# Patient Record
Sex: FEMALE | Race: WHITE | NOT HISPANIC OR LATINO | Employment: FULL TIME | URBAN - METROPOLITAN AREA
[De-identification: names, ages, dates, MRNs, and addresses within clinical notes are randomized per-mention and may not be internally consistent; named-entity substitution may affect disease eponyms.]

---

## 2017-04-15 LAB
25(OH)D3+25(OH)D2 SERPL-MCNC: 21.3 NG/ML (ref 30–100)
ALBUMIN/CREAT UR: 14.4 MG/G CREAT (ref 0–30)
BUN SERPL-MCNC: 25 MG/DL (ref 6–24)
BUN/CREAT SERPL: 27 (ref 9–23)
CALCIUM SERPL-MCNC: 10.6 MG/DL (ref 8.7–10.2)
CHLORIDE SERPL-SCNC: 105 MMOL/L (ref 96–106)
CO2 SERPL-SCNC: 23 MMOL/L (ref 18–29)
CREAT SERPL-MCNC: 0.91 MG/DL (ref 0.57–1)
CREAT UR-MCNC: 65.2 MG/DL
GLUCOSE SERPL-MCNC: 90 MG/DL (ref 65–99)
HBA1C MFR BLD: 5.7 % (ref 4.8–5.6)
MICROALBUMIN UR-MCNC: 9.4 UG/ML
POTASSIUM SERPL-SCNC: 4.4 MMOL/L (ref 3.5–5.2)
SODIUM SERPL-SCNC: 143 MMOL/L (ref 134–144)

## 2017-04-17 ENCOUNTER — TELEPHONE (OUTPATIENT)
Dept: MEDICAL GROUP | Facility: MEDICAL CENTER | Age: 57
End: 2017-04-17

## 2017-04-18 ENCOUNTER — OFFICE VISIT (OUTPATIENT)
Dept: MEDICAL GROUP | Facility: MEDICAL CENTER | Age: 57
End: 2017-04-18
Payer: COMMERCIAL

## 2017-04-18 VITALS
BODY MASS INDEX: 34.93 KG/M2 | WEIGHT: 185 LBS | DIASTOLIC BLOOD PRESSURE: 80 MMHG | HEART RATE: 85 BPM | OXYGEN SATURATION: 97 % | SYSTOLIC BLOOD PRESSURE: 120 MMHG | TEMPERATURE: 97.2 F | HEIGHT: 61 IN

## 2017-04-18 DIAGNOSIS — E79.0 HYPERURICEMIA: ICD-10-CM

## 2017-04-18 DIAGNOSIS — E55.9 VITAMIN D DEFICIENCY: ICD-10-CM

## 2017-04-18 DIAGNOSIS — K59.9 ABNORMAL LARGE BOWEL MOTILITY: ICD-10-CM

## 2017-04-18 DIAGNOSIS — E11.51 DM (DIABETES MELLITUS) TYPE II CONTROLLED PERIPHERAL VASCULAR DISORDER: ICD-10-CM

## 2017-04-18 DIAGNOSIS — E66.9 OBESITY (BMI 30-39.9): ICD-10-CM

## 2017-04-18 DIAGNOSIS — F32.89 OTHER DEPRESSION: ICD-10-CM

## 2017-04-18 DIAGNOSIS — E83.52 HYPERCALCEMIA: ICD-10-CM

## 2017-04-18 PROCEDURE — 99214 OFFICE O/P EST MOD 30 MIN: CPT | Performed by: NURSE PRACTITIONER

## 2017-04-18 ASSESSMENT — PATIENT HEALTH QUESTIONNAIRE - PHQ9: CLINICAL INTERPRETATION OF PHQ2 SCORE: 3

## 2017-04-19 NOTE — PROGRESS NOTES
Subjective:      Yancy Ga is a 57 y.o. female who presents with No chief complaint on file.            HPI  Seen in fu/ for DM.  She is moving to [facility] this week.  Will make sure she has 90 days supply of meds.  Feeling down.  She is depressed about the move.  She has been crying a lot about the move.    Reviewed lab with pt.  Her BMP shows elvated ca++ at 10.6.  Was 10.6 last time 6/16.  Has been as high as 11 in 2014.  PTH in past was wnl.  Globulin is wnl.    Vitamin d is low at 21.3.  She is on 2000 units d3 daily   She is due her mammo, retinal eye exam.  Cannot do here before leaving.   Alb/cr ratio is wnl.  a1c is well controlled on meds at 5.7  Better than last time.   She has a long hx of not having a bm for 3 weeks.   No abd pain.  Has upper abd bloating chronic.   She had a colonosocpy for bloating and no bm except every 3 weeks.  They found gluten intolerance and diverticulosis.  She thinks they also found polyps.  She thinks that she is due repeat in 3 years.  That would be 10/16.   Also only voids about 2x/day.  No discomfort.  Drinks adequate water.  Drinks lots of tea.    Has a murmur.  Recent echo showed mild MR.     Patient Active Problem List    Diagnosis Date Noted   • Obesity (BMI 30-39.9) 04/18/2017   • H/O cardiac catheterization    • Undiagnosed cardiac murmurs    • Preventative health care 10/08/2014   • Diabetes mellitus, type II (CMS-HCC)    • Essential hypertension, benign    • Seizures (CMS-HCC)    • Vitamin D deficiency    • Chronic gout    • Coronary artery disease due to lipid rich plaque    • Chronic constipation    • Chronic kidney disease (CKD), stage III (moderate)      Current Outpatient Prescriptions   Medication Sig Dispense Refill   • colchicine (COLCRYS) 0.6 MG Tab Take 1 Tab by mouth 2 times a day. 180 Tab 1   • B-D ULTRAFINE III SHORT PEN 31G X 8 MM Misc AS DIRECTED 100 Each 3   • allopurinol (ZYLOPRIM) 300 MG Tab Take 1 Tab by mouth every day. 90 Tab 1   • amlodipine  "(NORVASC) 10 MG Tab Take 1 Tab by mouth every day. 90 Tab 1   • liraglutide (VICTOZA) 18 MG/3ML Solution Pen-injector injection Inject 0.3 mL as instructed every day. 9 PEN 1   • omeprazole (PRILOSEC) 20 MG delayed-release capsule Take 1 Cap by mouth every day. 90 Cap 1   • lisinopril (PRINIVIL) 20 MG Tab Take 1 Tab by mouth 2 times a day. 180 Tab 1   • spironolactone (ALDACTONE) 25 MG Tab Take 1 Tab by mouth every day. 90 Tab 1   • zonisamide (ZONEGRAN) 25 MG capsule Take 2 Caps by mouth 2 times a day. 360 Cap 1   • gabapentin (NEURONTIN) 300 MG Cap   0     No current facility-administered medications for this visit.       ROS    Review of Systems   Constitutional: Negative.  Negative for fever, chills, weight loss, malaise/fatigue and diaphoresis.   HENT: Negative.  Negative for hearing loss, ear pain, nosebleeds, congestion, sore throat, neck pain, tinnitus and ear discharge.    Respiratory: Negative.  Negative for cough, hemoptysis, sputum production, shortness of breath, wheezing and stridor.    Cardiovascular: Negative.  Negative for chest pain, palpitations, orthopnea, claudication, leg swelling and PND.   Gastrointestinal: denies nausea, vomiting, diarrhea, heartburn, melena or hematochezia.  Genitourinary: Denies dysuria, hematuria, urinary incontinence, frequency or urgency.             Objective:     /80 mmHg  Pulse 85  Temp(Src) 36.2 °C (97.2 °F)  Ht 1.549 m (5' 1\")  Wt 83.915 kg (185 lb)  BMI 34.97 kg/m2  SpO2 97%  Breastfeeding? No     Physical Exam      Physical Exam   Vitals reviewed.  Constitutional: oriented to person, place, and time. appears well-developed and well-nourished. No distress.   HENT:  Head: Normocephalic and atraumatic. Right Ear: External ear normal. Left Ear: External ear normal. Eyes: Right eye exhibits no discharge. Left eye exhibits no discharge. No scleral icterus.  Neck: No JVD present.  Cardiovascular: Normal rate, regular rhythm, normal heart sounds and intact " distal pulses.  Exam reveals no gallop and no friction rub.  2/6 systolic murmur heard.  No carotid bruits.   Pulmonary/Chest: Effort normal and breath sounds normal. No stridor. No respiratory distress. no wheezes or rales. exhibits no tenderness.   Musculoskeletal: Normal range of motion. exhibits no edema. indigo pedal pulses 2+.  Lymphadenopathy: no cervical or supraclavicular adenopathy.   Abd:  No CVAT,  Soft,  Bs noted in all quadrants.  No HSM.  No abdominal tenderness.  Neurological: alert and oriented to person, place, and time. exhibits normal muscle tone. Coordination normal.   Skin: Skin is warm and dry. no diaphoresis.   Psychiatric: normal mood and affect. behavior is normal.            Assessment/Plan:     1. Hypercalcemia  VITAMIN 1,25 DIHYDROXY    PTH RELATED PEPTIDE    PTH was wnl.  will check PTHrp and 1,25 vitamin d.  she will f/u with new PCP in [place] in june.  we wll f/u with all test results.   2. DM (diabetes mellitus) type II controlled peripheral vascular disorder (CMS-HCC)      stabel on meds. s he is due rest of lab in 6/17 with CMP, LP.   3. Vitamin D deficiency      low d.  on 2000 units d3.  will increast to 4000 units recheck with next lab with new PCP if poss   4. Obesity (BMI 30-39.9)  Patient identified as having weight management issue.  Appropriate orders and counseling given.    she is asking for contrave.  will need to get from new PCP.     5. Hyperuricemia      last uric acid high at 10.4. she is due for repeat.    6. Abnormal large bowel motility      ? etiology but due for repeat colonoscopy poss.  will get last colonoscopy  only has bm every 3 weeks.     7. Other depression      depression over having to move. ; will f/u with new PCP.  if goes on contrave could improve sx. will discuss with new PCP     8. Will be due CMP, LP, uric acid, poss due colonoscopy.  Will be due mammo and retinal eye exam.  Will see new PCP after move for these orders.   9.  F/u with pt with test  results for elevated ca++

## 2017-04-23 RX ORDER — GABAPENTIN 300 MG/1
CAPSULE ORAL
Qty: 120 CAP | Refills: 0 | Status: SHIPPED | OUTPATIENT
Start: 2017-04-23 | End: 2020-12-24

## 2017-04-26 ENCOUNTER — TELEPHONE (OUTPATIENT)
Dept: MEDICAL GROUP | Facility: MEDICAL CENTER | Age: 57
End: 2017-04-26

## 2017-04-26 LAB
1,25(OH)2D3 SERPL-MCNC: 43.3 PG/ML (ref 19.9–79.3)
PTH RELATED PROT SERPL-SCNC: <1.1 PMOL/L

## 2017-04-26 NOTE — TELEPHONE ENCOUNTER
Please let pt know that the PTH special test and 1,25 d is wnl.  She will need additional w/u and prob refer to neph for eval when she gets moved to [place]

## 2020-12-10 ENCOUNTER — TELEPHONE (OUTPATIENT)
Dept: SCHEDULING | Facility: IMAGING CENTER | Age: 60
End: 2020-12-10

## 2020-12-24 ENCOUNTER — OFFICE VISIT (OUTPATIENT)
Dept: MEDICAL GROUP | Facility: PHYSICIAN GROUP | Age: 60
End: 2020-12-24
Payer: COMMERCIAL

## 2020-12-24 VITALS
BODY MASS INDEX: 37.11 KG/M2 | HEIGHT: 60 IN | HEART RATE: 90 BPM | WEIGHT: 189 LBS | SYSTOLIC BLOOD PRESSURE: 130 MMHG | DIASTOLIC BLOOD PRESSURE: 80 MMHG | OXYGEN SATURATION: 96 % | TEMPERATURE: 98.6 F | RESPIRATION RATE: 18 BRPM

## 2020-12-24 DIAGNOSIS — M10.00 ACUTE IDIOPATHIC GOUT, UNSPECIFIED SITE: ICD-10-CM

## 2020-12-24 DIAGNOSIS — Z12.31 ENCOUNTER FOR SCREENING MAMMOGRAM FOR MALIGNANT NEOPLASM OF BREAST: ICD-10-CM

## 2020-12-24 DIAGNOSIS — Z00.00 ANNUAL PHYSICAL EXAM: ICD-10-CM

## 2020-12-24 DIAGNOSIS — E11.59 TYPE 2 DIABETES MELLITUS WITH OTHER CIRCULATORY COMPLICATION, WITHOUT LONG-TERM CURRENT USE OF INSULIN (HCC): ICD-10-CM

## 2020-12-24 DIAGNOSIS — I10 ESSENTIAL HYPERTENSION: ICD-10-CM

## 2020-12-24 DIAGNOSIS — E78.2 MIXED HYPERLIPIDEMIA: ICD-10-CM

## 2020-12-24 DIAGNOSIS — M10.9 ACUTE GOUT OF RIGHT FOOT, UNSPECIFIED CAUSE: ICD-10-CM

## 2020-12-24 DIAGNOSIS — I10 ESSENTIAL HYPERTENSION, BENIGN: ICD-10-CM

## 2020-12-24 DIAGNOSIS — E55.9 VITAMIN D DEFICIENCY: ICD-10-CM

## 2020-12-24 DIAGNOSIS — H91.93 DECREASED HEARING OF BOTH EARS: ICD-10-CM

## 2020-12-24 PROCEDURE — 99214 OFFICE O/P EST MOD 30 MIN: CPT | Performed by: NURSE PRACTITIONER

## 2020-12-24 RX ORDER — SPIRONOLACTONE 25 MG/1
25 TABLET ORAL
Qty: 90 TAB | Refills: 0 | Status: SHIPPED | OUTPATIENT
Start: 2020-12-24 | End: 2021-07-06

## 2020-12-24 RX ORDER — ATORVASTATIN CALCIUM 20 MG/1
20 TABLET, FILM COATED ORAL DAILY
Qty: 90 TAB | Refills: 0 | Status: SHIPPED | OUTPATIENT
Start: 2020-12-24 | End: 2021-04-08 | Stop reason: SDUPTHER

## 2020-12-24 RX ORDER — ALLOPURINOL 300 MG/1
300 TABLET ORAL
Qty: 90 TAB | Refills: 0 | Status: SHIPPED | OUTPATIENT
Start: 2020-12-24 | End: 2021-04-08 | Stop reason: SDUPTHER

## 2020-12-24 RX ORDER — AMLODIPINE BESYLATE 10 MG/1
10 TABLET ORAL
Qty: 90 TAB | Refills: 0 | Status: SHIPPED | OUTPATIENT
Start: 2020-12-24 | End: 2021-04-08 | Stop reason: SDUPTHER

## 2020-12-24 RX ORDER — METFORMIN HYDROCHLORIDE 500 MG/1
500 TABLET, EXTENDED RELEASE ORAL DAILY
COMMUNITY
End: 2020-12-24 | Stop reason: SDUPTHER

## 2020-12-24 RX ORDER — ATORVASTATIN CALCIUM 20 MG/1
20 TABLET, FILM COATED ORAL NIGHTLY
COMMUNITY
End: 2020-12-24 | Stop reason: SDUPTHER

## 2020-12-24 RX ORDER — LISINOPRIL 20 MG/1
40 TABLET ORAL DAILY
Qty: 90 TAB | Refills: 0 | Status: SHIPPED | OUTPATIENT
Start: 2020-12-24 | End: 2021-04-08

## 2020-12-24 RX ORDER — COLCHICINE 0.6 MG/1
0.6 TABLET ORAL DAILY
Qty: 90 TAB | Refills: 0 | Status: SHIPPED | OUTPATIENT
Start: 2020-12-24 | End: 2021-04-08 | Stop reason: SDUPTHER

## 2020-12-24 RX ORDER — METFORMIN HYDROCHLORIDE 500 MG/1
500 TABLET, EXTENDED RELEASE ORAL DAILY
Qty: 90 TAB | Refills: 0 | Status: SHIPPED | OUTPATIENT
Start: 2020-12-24 | End: 2021-04-08 | Stop reason: SDUPTHER

## 2020-12-24 RX ORDER — PRAMIPEXOLE DIHYDROCHLORIDE 0.12 MG/1
0.12 TABLET ORAL 3 TIMES DAILY
Qty: 90 TAB | Refills: 3 | Status: SHIPPED | OUTPATIENT
Start: 2020-12-24 | End: 2021-04-08

## 2020-12-24 ASSESSMENT — PATIENT HEALTH QUESTIONNAIRE - PHQ9
5. POOR APPETITE OR OVEREATING: 3 - NEARLY EVERY DAY
SUM OF ALL RESPONSES TO PHQ QUESTIONS 1-9: 13
CLINICAL INTERPRETATION OF PHQ2 SCORE: 3

## 2020-12-24 NOTE — ASSESSMENT & PLAN NOTE
Chronic problem. Taking  Allopurinol 100 mg daily for maintenance and colchicine 0.6 mg. Last flare up over a year ago. Needs refills today.

## 2020-12-24 NOTE — PROGRESS NOTES
Chief Complaint   Patient presents with   • Establish Care       HISTORY OF PRESENT ILLNESS: Patient is a 60 y.o. female, established patient who presents today to discuss medical problems as listed below:    Health Maintenance:  COMPLETED     Essential hypertension, benign  Chronic problem. On lisinopril 40 mg daily, amlodipine 10 mg daily and spironolactone 25 mg. Denies CP, SOB, dizziness, dyspnea, SOB, dizziness, peripheral edema.     Chronic gout  Chronic problem. Taking  Allopurinol 100 mg daily for maintenance and colchicine 0.6 mg. Last flare up over a year ago. Needs refills today.     Mixed hyperlipidemia  Chronic problem, taking atorvastatin 20 mg, tolerating well . Needs refills today. Denies CP, dizziness, SOB, peripheral edema.     Diabetes mellitus, type II  Chronic problem. On MEtformin  mg daily. Has not seen a provider in 3 yrs. Denies CP, polyuria, polydipsia.     Decreased hearing of both ears  Chronic problem. Hard time hearing in both ears. Initially noticed in 2012, problem is slowly getting worse. She denies dizziness, popping or pain in ears.       Patient Active Problem List    Diagnosis Date Noted   • Mixed hyperlipidemia 12/24/2020   • Decreased hearing of both ears 12/24/2020   • Obesity (BMI 30-39.9) 04/18/2017   • H/O cardiac catheterization    • Undiagnosed cardiac murmurs    • Preventative health care 10/08/2014   • Diabetes mellitus, type II (HCC)    • Essential hypertension, benign    • Seizures (HCC)    • Vitamin D deficiency    • Chronic gout    • Coronary artery disease due to lipid rich plaque    • Chronic constipation    • Chronic kidney disease (CKD), stage III (moderate)         Allergies: Byetta    Current Outpatient Medications   Medication Sig Dispense Refill   • atorvastatin (LIPITOR) 20 MG Tab Take 20 mg by mouth every evening.     • metFORMIN ER (GLUCOPHAGE XR) 500 MG TABLET SR 24 HR Take 500 mg by mouth every day.     • colchicine (COLCRYS) 0.6 MG Tab Take 1 Tab  by mouth 2 times a day. 180 Tab 1   • B-D ULTRAFINE III SHORT PEN 31G X 8 MM Misc AS DIRECTED 100 Each 3   • allopurinol (ZYLOPRIM) 300 MG Tab Take 1 Tab by mouth every day. 90 Tab 1   • amlodipine (NORVASC) 10 MG Tab Take 1 Tab by mouth every day. 90 Tab 1   • lisinopril (PRINIVIL) 20 MG Tab Take 1 Tab by mouth 2 times a day. 180 Tab 1   • spironolactone (ALDACTONE) 25 MG Tab Take 1 Tab by mouth every day. 90 Tab 1   • zonisamide (ZONEGRAN) 25 MG capsule Take 2 Caps by mouth 2 times a day. 360 Cap 1     No current facility-administered medications for this visit.        Social History     Tobacco Use   • Smoking status: Never Smoker   • Smokeless tobacco: Never Used   Substance Use Topics   • Alcohol use: No     Alcohol/week: 0.0 oz     Comment: rare   • Drug use: No     Social History     Social History Narrative   • Not on file       Family History   Problem Relation Age of Onset   • Heart Disease Father 72   • Diabetes Father    • Hypertension Father    • Hyperlipidemia Father    • Hypertension Brother    • Cancer Maternal Grandfather         prostate       Allergies, past medical history, past surgical history, family history, social history reviewed and updated.    Review of Systems:     - Constitutional: Negative for fever, chills, unexpected weight change, and fatigue/generalized weakness.     - HEENT: Negative for headaches, vision changes, hearing changes, ear pain, ear discharge, rhinorrhea, sinus congestion, sore throat, and neck pain.      - Respiratory: Negative for cough, sputum production, chest congestion, dyspnea, wheezing, and crackles.      - Cardiovascular: Negative for chest pain, palpitations, orthopnea, and bilateral lower extremity edema.     - Gastrointestinal: Negative for heartburn, nausea, vomiting, abdominal pain, hematochezia, melena, diarrhea, constipation, and greasy/foul-smelling stools.     - Genitourinary: Negative for dysuria, polyuria, hematuria, pyuria, urinary urgency, and  urinary incontinence.    - Musculoskeletal: Negative for myalgias, back pain, and joint pain.     - Skin: Negative for rash, itching, cyanotic skin color change.     - Neurological: Negative for dizziness, tingling, tremors, focal sensory deficit, focal weakness and headaches.     - Endo/Heme/Allergies: Does not bruise/bleed easily.     - Psychiatric/Behavioral: Negative for depression, suicidal/homicidal ideation and memory loss.      All other systems reviewed and are negative    Exam:    /80 (BP Location: Left arm, Patient Position: Sitting, BP Cuff Size: Adult)   Pulse 90   Temp 37 °C (98.6 °F) (Temporal)   Resp 18   Ht 1.524 m (5')   Wt 85.7 kg (189 lb)   SpO2 96%   BMI 36.91 kg/m²  Body mass index is 36.91 kg/m².    Physical Exam:  Constitutional: Well-developed and well-nourished. Not diaphoretic. No distress.   Skin: Skin is warm and dry. No rash noted.  Head: Atraumatic without lesions.  Eyes: Conjunctivae and extraocular motions are normal. Pupils are equal, round, and reactive to light. No scleral icterus.   Ears:  External ears unremarkable. Tympanic membranes clear and intact.  Nose: Nares patent. Septum midline. Turbinates without erythema nor edema. No discharge.   Mouth/Throat: Dentition is normal. Tongue normal. Oropharynx is clear and moist. Posterior pharynx without erythema or exudates.  Neck: Supple, trachea midline. Normal range of motion. No thyromegaly present. No lymphadenopathy--cervical or supraclavicular.  Cardiovascular: Regular rate and rhythm, S1 and S2 without murmur, rubs, or gallops.    Chest: Effort normal. Clear to auscultation throughout. No adventitious sounds. No CVA tenderness.  Abdomen: Soft, non tender, and without distention. Active bowel sounds in all four quadrants. No rebound, guarding, masses or HSM.  : Negative for dysuria, polyuria, hematuria, pyuria, urinary urgency, and urinary incontinence.  Extremities: No cyanosis, clubbing, erythema, nor edema.  Distal pulses intact and symmetric.   Musculoskeletal: All major joints AROM full in all directions without pain.  Neurological: Alert and oriented x 3. DTRs 2+/3 and symmetric. No cranial nerve deficit. 5/5 myotomes. Sensation intact. Negative Rhomberg.  Psychiatric:  Behavior, mood, and affect are appropriate.  MA/nursing note and vitals reviewed.    LABS: 2017  results reviewed and discussed with the patient, questions answered.    Assessment/Plan:  1. Essential hypertension, benign  We will review labs and discuss findings with patient.  Refills given.    2. Mixed hyperlipidemia  Review labs and discuss findings with patient.  Refills given.  - Lipid Profile; Future    3. Encounter for screening mammogram for malignant neoplasm of breast  - MA-SCREENING MAMMO BILAT W/TOMOSYNTHESIS W/CAD; Future    4. Type 2 diabetes mellitus with other circulatory complication, without long-term current use of insulin (HCC)  Review labs, discussed findings with patient.  Refills given.  - REFERRAL TO OPHTHALMOLOGY  - CBC WITH DIFFERENTIAL; Future  - Comp Metabolic Panel; Future  - HEMOGLOBIN A1C; Future  - MICROALBUMIN CREAT RATIO URINE; Future    5. Annual physical exam  - FREE THYROXINE; Future  - TSH; Future    6. Vitamin D deficiency  - VITAMIN D,25 HYDROXY; Future    7. Decreased hearing of both ears  Worsening.  Patient to follow-up with ENT.  - REFERRAL TO ENT       Discussed with patient possible alternative diagnoses, pt is to take all medications as prescribed. If symptoms persist FU w/PCP, if symptoms worsen go to emergency room. If experiencing any side effects from prescribed medications reports to the office immediately or go to emergency room.  Reviewed indication, dosage, usage and potential adverse effects of prescribed medications. Reviewed risks and benefits of treatment plan. Patient verbalizes understanding of all instruction and verbally agrees to plan.    No follow-ups on file.

## 2020-12-24 NOTE — ASSESSMENT & PLAN NOTE
Chronic problem. On MEtformin  mg daily. Has not seen a provider in 3 yrs. Denies CP, polyuria, polydipsia.

## 2020-12-24 NOTE — ASSESSMENT & PLAN NOTE
Chronic problem, taking atorvastatin 20 mg, tolerating well . Needs refills today. Denies CP, dizziness, SOB, peripheral edema.

## 2020-12-24 NOTE — ASSESSMENT & PLAN NOTE
Chronic problem. On lisinopril 40 mg daily, amlodipine 10 mg daily and spironolactone 25 mg. Denies CP, SOB, dizziness, dyspnea, SOB, dizziness, peripheral edema.

## 2020-12-24 NOTE — ASSESSMENT & PLAN NOTE
Chronic problem. Hard time hearing in both ears. Initially noticed in 2012, problem is slowly getting worse. She denies dizziness, popping or pain in ears.

## 2021-03-16 ENCOUNTER — TELEPHONE (OUTPATIENT)
Dept: MEDICAL GROUP | Facility: MEDICAL CENTER | Age: 61
End: 2021-03-16

## 2021-03-16 NOTE — TELEPHONE ENCOUNTER
Pt insurance called asking for referral to see ophthalmology. Dr Daniel Morel  NPI: [id number]  DX: [phone]

## 2021-03-16 NOTE — LETTER
March 18, 2021        Yancy Danielson [address]  [facility] [address]        Dear Yancy:    Taylor Soni's office has tried contacting you. At your earliest convenience please contact our office at [phone].      If you have any questions or concerns, please don't hesitate to call.        Sincerely,        FRANK Gillespie.    Electronically Signed

## 2021-03-25 ENCOUNTER — TELEPHONE (OUTPATIENT)
Dept: MEDICAL GROUP | Facility: MEDICAL CENTER | Age: 61
End: 2021-03-25

## 2021-03-25 NOTE — TELEPHONE ENCOUNTER
VOICEMAIL  1. Caller Name: Mayela Dent                      Call Back Number: [phone]    2. Message: Dr Daniel Morel office calling to get the referral. Per last encounter pt is no longer under [facility] care.     3. Patient approves office to leave a detailed voicemail/MyChart message: N\A

## 2021-03-25 NOTE — TELEPHONE ENCOUNTER
Notified Mayela from Dr. Morel office that pt is no longer under Taylor's care about would have to reestablish care in order to get the referral.

## 2021-03-25 NOTE — TELEPHONE ENCOUNTER
Mayela stated she will attempt to notify the pt I let her know that we have tried and sent her a letter.

## 2021-03-31 ENCOUNTER — TELEPHONE (OUTPATIENT)
Dept: MEDICAL GROUP | Facility: PHYSICIAN GROUP | Age: 61
End: 2021-03-31

## 2021-03-31 DIAGNOSIS — E11.59 TYPE 2 DIABETES MELLITUS WITH OTHER CIRCULATORY COMPLICATION, WITHOUT LONG-TERM CURRENT USE OF INSULIN (HCC): ICD-10-CM

## 2021-03-31 NOTE — TELEPHONE ENCOUNTER
An office had called regarding to daniel. Her healthcare is waiting for a new referral, due to her insurance not taking it. She needs a new referral to be able to get her cataract surgery. Her surgery is on .     Let me know if she needs an appt or if you are able to give her a new referral. You have given her a referral back in dec, but I believe it is .     Please advise

## 2021-04-06 ENCOUNTER — HOSPITAL ENCOUNTER (OUTPATIENT)
Dept: LAB | Facility: MEDICAL CENTER | Age: 61
End: 2021-04-06
Attending: NURSE PRACTITIONER
Payer: COMMERCIAL

## 2021-04-06 DIAGNOSIS — E55.9 VITAMIN D DEFICIENCY: ICD-10-CM

## 2021-04-06 DIAGNOSIS — Z00.00 ANNUAL PHYSICAL EXAM: ICD-10-CM

## 2021-04-06 DIAGNOSIS — E78.2 MIXED HYPERLIPIDEMIA: ICD-10-CM

## 2021-04-06 DIAGNOSIS — E11.59 TYPE 2 DIABETES MELLITUS WITH OTHER CIRCULATORY COMPLICATION, WITHOUT LONG-TERM CURRENT USE OF INSULIN (HCC): ICD-10-CM

## 2021-04-06 LAB
ALBUMIN SERPL BCP-MCNC: 4.2 G/DL (ref 3.2–4.9)
ALBUMIN/GLOB SERPL: 1.2 G/DL
ALP SERPL-CCNC: 96 U/L (ref 30–99)
ALT SERPL-CCNC: 17 U/L (ref 2–50)
ANION GAP SERPL CALC-SCNC: 8 MMOL/L (ref 7–16)
AST SERPL-CCNC: 18 U/L (ref 12–45)
BASOPHILS # BLD AUTO: 0.6 % (ref 0–1.8)
BASOPHILS # BLD: 0.05 K/UL (ref 0–0.12)
BILIRUB SERPL-MCNC: 0.3 MG/DL (ref 0.1–1.5)
BUN SERPL-MCNC: 24 MG/DL (ref 8–22)
CALCIUM SERPL-MCNC: 11.1 MG/DL (ref 8.5–10.5)
CHLORIDE SERPL-SCNC: 105 MMOL/L (ref 96–112)
CHOLEST SERPL-MCNC: 118 MG/DL (ref 100–199)
CO2 SERPL-SCNC: 24 MMOL/L (ref 20–33)
CREAT SERPL-MCNC: 0.85 MG/DL (ref 0.5–1.4)
CREAT UR-MCNC: 72.58 MG/DL
EOSINOPHIL # BLD AUTO: 0.39 K/UL (ref 0–0.51)
EOSINOPHIL NFR BLD: 5.1 % (ref 0–6.9)
ERYTHROCYTE [DISTWIDTH] IN BLOOD BY AUTOMATED COUNT: 54.4 FL (ref 35.9–50)
FASTING STATUS PATIENT QL REPORTED: NORMAL
GLOBULIN SER CALC-MCNC: 3.4 G/DL (ref 1.9–3.5)
GLUCOSE SERPL-MCNC: 171 MG/DL (ref 65–99)
HCT VFR BLD AUTO: 36.8 % (ref 37–47)
HDLC SERPL-MCNC: 53 MG/DL
HGB BLD-MCNC: 12.2 G/DL (ref 12–16)
IMM GRANULOCYTES # BLD AUTO: 0.03 K/UL (ref 0–0.11)
IMM GRANULOCYTES NFR BLD AUTO: 0.4 % (ref 0–0.9)
LDLC SERPL CALC-MCNC: 46 MG/DL
LYMPHOCYTES # BLD AUTO: 1.86 K/UL (ref 1–4.8)
LYMPHOCYTES NFR BLD: 24.2 % (ref 22–41)
MCH RBC QN AUTO: 34.4 PG (ref 27–33)
MCHC RBC AUTO-ENTMCNC: 33.2 G/DL (ref 33.6–35)
MCV RBC AUTO: 103.7 FL (ref 81.4–97.8)
MICROALBUMIN UR-MCNC: <1.2 MG/DL
MICROALBUMIN/CREAT UR: NORMAL MG/G (ref 0–30)
MONOCYTES # BLD AUTO: 0.54 K/UL (ref 0–0.85)
MONOCYTES NFR BLD AUTO: 7 % (ref 0–13.4)
NEUTROPHILS # BLD AUTO: 4.83 K/UL (ref 2–7.15)
NEUTROPHILS NFR BLD: 62.7 % (ref 44–72)
NRBC # BLD AUTO: 0 K/UL
NRBC BLD-RTO: 0 /100 WBC
PLATELET # BLD AUTO: 193 K/UL (ref 164–446)
PMV BLD AUTO: 12.2 FL (ref 9–12.9)
POTASSIUM SERPL-SCNC: 4.8 MMOL/L (ref 3.6–5.5)
PROT SERPL-MCNC: 7.6 G/DL (ref 6–8.2)
RBC # BLD AUTO: 3.55 M/UL (ref 4.2–5.4)
SODIUM SERPL-SCNC: 137 MMOL/L (ref 135–145)
TRIGL SERPL-MCNC: 95 MG/DL (ref 0–149)
WBC # BLD AUTO: 7.7 K/UL (ref 4.8–10.8)

## 2021-04-06 PROCEDURE — 36415 COLL VENOUS BLD VENIPUNCTURE: CPT

## 2021-04-06 PROCEDURE — 82043 UR ALBUMIN QUANTITATIVE: CPT

## 2021-04-06 PROCEDURE — 84443 ASSAY THYROID STIM HORMONE: CPT

## 2021-04-06 PROCEDURE — 82570 ASSAY OF URINE CREATININE: CPT

## 2021-04-06 PROCEDURE — 80061 LIPID PANEL: CPT

## 2021-04-06 PROCEDURE — 82306 VITAMIN D 25 HYDROXY: CPT

## 2021-04-06 PROCEDURE — 83036 HEMOGLOBIN GLYCOSYLATED A1C: CPT

## 2021-04-06 PROCEDURE — 85025 COMPLETE CBC W/AUTO DIFF WBC: CPT

## 2021-04-06 PROCEDURE — 84439 ASSAY OF FREE THYROXINE: CPT

## 2021-04-06 PROCEDURE — 80053 COMPREHEN METABOLIC PANEL: CPT

## 2021-04-07 LAB
EST. AVERAGE GLUCOSE BLD GHB EST-MCNC: 220 MG/DL
HBA1C MFR BLD: 9.3 % (ref 4–5.6)
T4 FREE SERPL-MCNC: 1.05 NG/DL (ref 0.93–1.7)
TSH SERPL DL<=0.005 MIU/L-ACNC: 1.85 UIU/ML (ref 0.38–5.33)

## 2021-04-08 ENCOUNTER — OFFICE VISIT (OUTPATIENT)
Dept: MEDICAL GROUP | Facility: PHYSICIAN GROUP | Age: 61
End: 2021-04-08
Payer: COMMERCIAL

## 2021-04-08 VITALS
OXYGEN SATURATION: 93 % | DIASTOLIC BLOOD PRESSURE: 78 MMHG | WEIGHT: 190 LBS | SYSTOLIC BLOOD PRESSURE: 128 MMHG | HEIGHT: 61 IN | HEART RATE: 74 BPM | RESPIRATION RATE: 12 BRPM | BODY MASS INDEX: 35.87 KG/M2 | TEMPERATURE: 98.7 F

## 2021-04-08 DIAGNOSIS — M10.00 ACUTE IDIOPATHIC GOUT, UNSPECIFIED SITE: ICD-10-CM

## 2021-04-08 DIAGNOSIS — R56.9 SEIZURES (HCC): ICD-10-CM

## 2021-04-08 DIAGNOSIS — E78.2 MIXED HYPERLIPIDEMIA: ICD-10-CM

## 2021-04-08 DIAGNOSIS — M1A.9XX0 CHRONIC GOUT INVOLVING TOE OF LEFT FOOT WITHOUT TOPHUS, UNSPECIFIED CAUSE: ICD-10-CM

## 2021-04-08 DIAGNOSIS — I10 ESSENTIAL HYPERTENSION: ICD-10-CM

## 2021-04-08 DIAGNOSIS — E11.59 TYPE 2 DIABETES MELLITUS WITH OTHER CIRCULATORY COMPLICATION, WITHOUT LONG-TERM CURRENT USE OF INSULIN (HCC): ICD-10-CM

## 2021-04-08 DIAGNOSIS — M10.9 ACUTE GOUT OF RIGHT FOOT, UNSPECIFIED CAUSE: ICD-10-CM

## 2021-04-08 DIAGNOSIS — I10 ESSENTIAL HYPERTENSION, BENIGN: ICD-10-CM

## 2021-04-08 LAB — 25(OH)D3 SERPL-MCNC: 18 NG/ML (ref 30–80)

## 2021-04-08 PROCEDURE — 99214 OFFICE O/P EST MOD 30 MIN: CPT | Performed by: NURSE PRACTITIONER

## 2021-04-08 RX ORDER — AMLODIPINE BESYLATE 10 MG/1
10 TABLET ORAL
Qty: 90 TABLET | Refills: 0 | Status: SHIPPED | OUTPATIENT
Start: 2021-04-08 | End: 2021-04-08 | Stop reason: SDUPTHER

## 2021-04-08 RX ORDER — METFORMIN HYDROCHLORIDE 500 MG/1
1000 TABLET, EXTENDED RELEASE ORAL 2 TIMES DAILY
Qty: 360 TABLET | Refills: 3 | Status: SHIPPED | OUTPATIENT
Start: 2021-04-08 | End: 2022-06-28

## 2021-04-08 RX ORDER — ZONISAMIDE 25 MG/1
50 CAPSULE ORAL 2 TIMES DAILY
Qty: 360 CAPSULE | Refills: 1 | Status: SHIPPED | OUTPATIENT
Start: 2021-04-08 | End: 2021-10-01

## 2021-04-08 RX ORDER — ATORVASTATIN CALCIUM 20 MG/1
20 TABLET, FILM COATED ORAL DAILY
Qty: 90 TABLET | Refills: 3 | Status: SHIPPED | OUTPATIENT
Start: 2021-04-08 | End: 2022-05-10

## 2021-04-08 RX ORDER — LISINOPRIL 40 MG/1
40 TABLET ORAL DAILY
Qty: 90 TABLET | Refills: 3 | Status: SHIPPED | OUTPATIENT
Start: 2021-04-08 | End: 2022-06-28

## 2021-04-08 RX ORDER — ALLOPURINOL 300 MG/1
300 TABLET ORAL
Qty: 90 TABLET | Refills: 3 | Status: SHIPPED | OUTPATIENT
Start: 2021-04-08 | End: 2022-05-10

## 2021-04-08 RX ORDER — COLCHICINE 0.6 MG/1
0.6 TABLET ORAL DAILY
Qty: 90 TABLET | Refills: 0 | Status: SHIPPED | OUTPATIENT
Start: 2021-04-08 | End: 2022-05-10

## 2021-04-08 RX ORDER — ATORVASTATIN CALCIUM 20 MG/1
20 TABLET, FILM COATED ORAL DAILY
Qty: 90 TABLET | Refills: 0 | Status: SHIPPED | OUTPATIENT
Start: 2021-04-08 | End: 2021-04-08 | Stop reason: SDUPTHER

## 2021-04-08 RX ORDER — LISINOPRIL 40 MG/1
40 TABLET ORAL
COMMUNITY
Start: 2021-01-30 | End: 2021-04-08

## 2021-04-08 RX ORDER — AMLODIPINE BESYLATE 10 MG/1
10 TABLET ORAL
Qty: 90 TABLET | Refills: 3 | Status: SHIPPED | OUTPATIENT
Start: 2021-04-08 | End: 2022-05-10

## 2021-04-08 RX ORDER — ALLOPURINOL 300 MG/1
300 TABLET ORAL
Qty: 90 TABLET | Refills: 0 | Status: SHIPPED | OUTPATIENT
Start: 2021-04-08 | End: 2021-04-08 | Stop reason: SDUPTHER

## 2021-04-08 ASSESSMENT — PATIENT HEALTH QUESTIONNAIRE - PHQ9
CLINICAL INTERPRETATION OF PHQ2 SCORE: 2
SUM OF ALL RESPONSES TO PHQ QUESTIONS 1-9: 13
5. POOR APPETITE OR OVEREATING: 2 - MORE THAN HALF THE DAYS

## 2021-04-08 ASSESSMENT — FIBROSIS 4 INDEX: FIB4 SCORE: 1.36

## 2021-04-08 NOTE — ASSESSMENT & PLAN NOTE
Chronic problem.  This is well controlled on lisinopril 20 mg twice daily amlodipine 10 mg daily.  BP today is 128/78 with a pulse of 74.  Reviewed recent labs, GFR from April 2021 WNL.

## 2021-04-08 NOTE — ASSESSMENT & PLAN NOTE
Chronic problem.  Patient taking allopurinol 100 mg for maintenance and colchicine 0.6 mg as needed.  Needing refills on both today.  It is well controlled, last flareup a few months ago.

## 2021-04-08 NOTE — ASSESSMENT & PLAN NOTE
Chronic problem for patient.  Well-controlled on zonisamide 50 mg once a day.  History of seizures since 1999, was followed by neurology in [place].  Has not been established with neurology.  Refills today.  Frequency of seizures once or twice per year, will last episode 3 months ago.

## 2021-04-08 NOTE — ASSESSMENT & PLAN NOTE
Results for PEDRO SMALLWOOD (MRN [id number]) as of 4/8/2021 15:02   Ref. Range 4/6/2021 07:41   Glycohemoglobin Latest Ref Range: 4.0 - 5.6 % 9.3 (H)   Chronic problem.  Patient is on Metformin 500 mg daily.  Patient admits to polyuria and polydipsia.  She also admits to neuropathy.  Last vision check was a few months ago.  Patient is requiring cataract surgery.  She denies CP, dyspnea, dizziness.

## 2021-04-08 NOTE — PROGRESS NOTES
Chief Complaint   Patient presents with   • Lab Results   • Shoulder Pain     Ongoing 1 mo   • Knee Pain     Buckling today       HISTORY OF PRESENT ILLNESS: Patient is a 61 y.o. female, established patient who presents today to discuss medical problems as listed below:    Health Maintenance:  COMPLETED     Diabetes mellitus, type II  Results for PEDRO SMALLWOOD (MRN [id number]) as of 4/8/2021 15:02   Ref. Range 4/6/2021 07:41   Glycohemoglobin Latest Ref Range: 4.0 - 5.6 % 9.3 (H)   Chronic problem.  Patient is on Metformin 500 mg daily.  Patient admits to polyuria and polydipsia.  She also admits to neuropathy.  Last vision check was a few months ago.  Patient is requiring cataract surgery.  She denies CP, dyspnea, dizziness.    Mixed hyperlipidemia  Results for PEDRO SMALLWOOD (MRN [id number]) as of 4/8/2021 15:02   Ref. Range 4/6/2021 07:41   Cholesterol,Tot Latest Ref Range: 100 - 199 mg/dL 118   Triglycerides Latest Ref Range: 0 - 149 mg/dL 95   HDL Latest Ref Range: >=40 mg/dL 53   LDL Latest Ref Range: <100 mg/dL 46   Chronic problem.  This is well controlled on Lipitor 20 mg.  Denies CP, dyspnea, dizziness.  Is non-smoker.    Essential hypertension, benign  Chronic problem.  This is well controlled on lisinopril 20 mg twice daily amlodipine 10 mg daily.  BP today is 128/78 with a pulse of 74.  Reviewed recent labs, GFR from April 2021 WNL.    Chronic gout  Chronic problem.  Patient taking allopurinol 100 mg for maintenance and colchicine 0.6 mg as needed.  Needing refills on both today.  It is well controlled, last flareup a few months ago.    Seizures  Chronic problem for patient.  Well-controlled on zonisamide 50 mg once a day.  History of seizures since 1999, was followed by neurology in [place].  Has not been established with neurology.  Refills today.  Frequency of seizures once or twice per year, will last episode 3 months ago.      Patient Active Problem List    Diagnosis Date Noted   • Mixed  hyperlipidemia 12/24/2020   • Decreased hearing of both ears 12/24/2020   • Obesity (BMI 30-39.9) 04/18/2017   • H/O cardiac catheterization    • Undiagnosed cardiac murmurs    • Preventative health care 10/08/2014   • Diabetes mellitus, type II (HCC)    • Essential hypertension, benign    • Seizures (HCC)    • Vitamin D deficiency    • Chronic gout    • Coronary artery disease due to lipid rich plaque    • Chronic constipation    • Chronic kidney disease (CKD), stage III (moderate)         Allergies: Byetta    Current Outpatient Medications   Medication Sig Dispense Refill   • metFORMIN ER (GLUCOPHAGE XR) 500 MG TABLET SR 24 HR Take 2 Tablets by mouth 2 times a day. 360 tablet 3   • lisinopril (PRINIVIL) 40 MG tablet Take 1 tablet by mouth every day. 90 tablet 3   • amLODIPine (NORVASC) 10 MG Tab Take 1 tablet by mouth every day. 90 tablet 3   • atorvastatin (LIPITOR) 20 MG Tab Take 1 tablet by mouth every day. 90 tablet 3   • allopurinol (ZYLOPRIM) 300 MG Tab Take 1 tablet by mouth every day. 90 tablet 3   • colchicine (COLCRYS) 0.6 MG Tab Take 1 tablet by mouth every day. 90 tablet 0   • zonisamide (ZONEGRAN) 25 MG capsule Take 2 Capsules by mouth 2 times a day. 360 capsule 1   • spironolactone (ALDACTONE) 25 MG Tab Take 1 Tab by mouth every day. 90 Tab 0     No current facility-administered medications for this visit.       Social History     Tobacco Use   • Smoking status: Never Smoker   • Smokeless tobacco: Never Used   Substance Use Topics   • Alcohol use: No     Alcohol/week: 0.0 oz     Comment: rare   • Drug use: No     Social History     Social History Narrative   • Not on file       Family History   Problem Relation Age of Onset   • Heart Disease Father 72   • Diabetes Father    • Hypertension Father    • Hyperlipidemia Father    • Hypertension Brother    • Stroke Brother    • Cancer Maternal Grandfather 70        colon       Allergies, past medical history, past surgical history, family history, social  "history reviewed and updated.    Review of Systems:     - Constitutional: Negative for fever, chills, unexpected weight change, and fatigue/generalized weakness.     - Respiratory: Negative for cough, sputum production, chest congestion, dyspnea, wheezing, and crackles.      - Cardiovascular: Negative for chest pain, palpitations, orthopnea, and bilateral lower extremity edema.     - Psychiatric/Behavioral: Negative for depression, suicidal/homicidal ideation and memory loss.      All other systems reviewed and are negative    Exam:    /78   Pulse 74   Temp 37.1 °C (98.7 °F) (Temporal)   Resp 12   Ht 1.549 m (5' 1\")   Wt 86.2 kg (190 lb)   SpO2 93%   BMI 35.90 kg/m²  Body mass index is 35.9 kg/m².    Physical Exam:  Constitutional: Well-developed and well-nourished. Not diaphoretic. No distress.   Cardiovascular: Regular rate and rhythm, S1 and S2 without murmur, rubs, or gallops.    Chest: Effort normal. Clear to auscultation throughout. No adventitious sounds.  Neurological: Alert and oriented x 3.   Psychiatric:  Behavior, mood, and affect are appropriate.  MA/nursing note and vitals reviewed.    LABS: 4/6/21  results reviewed and discussed with the patient, questions answered.    My total time spent caring for the patient on the day of the encounter was 25 minutes.   This does not include time spent on separately billable procedures/tests.     Assessment/Plan:  1. Type 2 diabetes mellitus with other circulatory complication, without long-term current use of insulin (HCC)  Uncontrolled.  Discussed etiology and potential respecters of patient.  Thorough discussion of diet choices that had diabetes friendly.  Avoid excessive carbohydrates and starches.  Will increase Metformin to 1000 mg in a.m. and 5 mg in p.m.  Educated on avoidance of hypoglycemic episodes.  We will follow-up next visit.  - Diabetic Monofilament LE Exam  - metFORMIN ER (GLUCOPHAGE XR) 500 MG TABLET SR 24 HR; Take 2 Tablets by mouth " 2 times a day.  Dispense: 360 tablet; Refill: 3    2. Mixed hyperlipidemia  Stable on current regimen.  Continue.  Refills given.  - atorvastatin (LIPITOR) 20 MG Tab; Take 1 tablet by mouth every day.  Dispense: 90 tablet; Refill: 3    3. Essential hypertension, benign  Stable on current regimen.  Continue.  Refills given.    4. Essential hypertension  Stable on current regimen.  Continue.  Refills given.  - lisinopril (PRINIVIL) 40 MG tablet; Take 1 tablet by mouth every day.  Dispense: 90 tablet; Refill: 3  - amLODIPine (NORVASC) 10 MG Tab; Take 1 tablet by mouth every day.  Dispense: 90 tablet; Refill: 3    5. Chronic gout involving toe of left foot without tophus, unspecified cause  6. Acute idiopathic gout, unspecified site  7. Acute gout of right foot, unspecified cause  Stable on current regimen.  Continue.  Refills given.  Discussed foods to avoid for prevention.  - allopurinol (ZYLOPRIM) 300 MG Tab; Take 1 tablet by mouth every day.  Dispense: 90 tablet; Refill: 3  - colchicine (COLCRYS) 0.6 MG Tab; Take 1 tablet by mouth every day.  Dispense: 90 tablet; Refill: 0    8. Seizures (HCC)  Stable on current regimen.  Continue.  Refills given.  Referral to neurology placed.  - REFERRAL TO NEUROLOGY  - zonisamide (ZONEGRAN) 25 MG capsule; Take 2 Capsules by mouth 2 times a day.  Dispense: 360 capsule; Refill: 1      Discussed with patient possible alternative diagnoses, pt is to take all medications as prescribed. If symptoms persist FU w/PCP, if symptoms worsen go to emergency room. If experiencing any side effects from prescribed medications reports to the office immediately or go to emergency room.  Reviewed indication, dosage, usage and potential adverse effects of prescribed medications. Reviewed risks and benefits of treatment plan. Patient verbalizes understanding of all instruction and verbally agrees to plan.    No follow-ups on file. q3 mos

## 2021-04-08 NOTE — ASSESSMENT & PLAN NOTE
Results for PEDRO SMALLWOOD (MRN [id number]) as of 4/8/2021 15:02   Ref. Range 4/6/2021 07:41   Cholesterol,Tot Latest Ref Range: 100 - 199 mg/dL 118   Triglycerides Latest Ref Range: 0 - 149 mg/dL 95   HDL Latest Ref Range: >=40 mg/dL 53   LDL Latest Ref Range: <100 mg/dL 46   Chronic problem.  This is well controlled on Lipitor 20 mg.  Denies CP, dyspnea, dizziness.  Is non-smoker.

## 2021-06-09 ENCOUNTER — APPOINTMENT (OUTPATIENT)
Dept: MEDICAL GROUP | Facility: PHYSICIAN GROUP | Age: 61
End: 2021-06-09
Payer: COMMERCIAL

## 2021-07-03 DIAGNOSIS — I10 ESSENTIAL HYPERTENSION: ICD-10-CM

## 2021-07-06 RX ORDER — SPIRONOLACTONE 25 MG/1
TABLET ORAL
Qty: 90 TABLET | Refills: 0 | Status: SHIPPED | OUTPATIENT
Start: 2021-07-06 | End: 2021-10-01

## 2021-10-01 DIAGNOSIS — R56.9 SEIZURES (HCC): ICD-10-CM

## 2021-10-01 RX ORDER — ZONISAMIDE 25 MG/1
CAPSULE ORAL
Qty: 360 CAPSULE | Refills: 0 | Status: SHIPPED | OUTPATIENT
Start: 2021-10-01 | End: 2021-12-30

## 2021-12-30 DIAGNOSIS — R56.9 SEIZURES (HCC): ICD-10-CM

## 2021-12-30 DIAGNOSIS — I10 ESSENTIAL HYPERTENSION: ICD-10-CM

## 2021-12-30 RX ORDER — ZONISAMIDE 25 MG/1
CAPSULE ORAL
Qty: 360 CAPSULE | Refills: 0 | Status: SHIPPED | OUTPATIENT
Start: 2021-12-30

## 2021-12-30 RX ORDER — SPIRONOLACTONE 25 MG/1
TABLET ORAL
Qty: 90 TABLET | Refills: 0 | Status: SHIPPED | OUTPATIENT
Start: 2021-12-30 | End: 2022-01-29

## 2022-01-29 PROBLEM — E11.10 DKA, TYPE 2, NOT AT GOAL (HCC): Status: ACTIVE | Noted: 2022-01-29

## 2022-01-31 PROBLEM — E11.10 DKA, TYPE 2, NOT AT GOAL (HCC): Status: RESOLVED | Noted: 2022-01-29 | Resolved: 2022-01-31

## 2022-02-01 DIAGNOSIS — E11.59 TYPE 2 DIABETES MELLITUS WITH OTHER CIRCULATORY COMPLICATION, WITHOUT LONG-TERM CURRENT USE OF INSULIN (HCC): ICD-10-CM

## 2022-03-17 PROBLEM — E11.40 DIABETIC NEUROPATHY (HCC): Status: ACTIVE | Noted: 2022-03-17

## 2022-05-08 DIAGNOSIS — E78.2 MIXED HYPERLIPIDEMIA: ICD-10-CM

## 2022-05-08 DIAGNOSIS — I10 ESSENTIAL HYPERTENSION: ICD-10-CM

## 2022-05-08 DIAGNOSIS — M10.9 ACUTE GOUT OF RIGHT FOOT, UNSPECIFIED CAUSE: ICD-10-CM

## 2022-05-10 RX ORDER — ALLOPURINOL 300 MG/1
300 TABLET ORAL
Qty: 90 TABLET | Refills: 3 | Status: SHIPPED | OUTPATIENT
Start: 2022-05-10

## 2022-05-10 RX ORDER — AMLODIPINE BESYLATE 10 MG/1
10 TABLET ORAL
Qty: 90 TABLET | Refills: 3 | Status: SHIPPED | OUTPATIENT
Start: 2022-05-10

## 2022-05-10 RX ORDER — ATORVASTATIN CALCIUM 20 MG/1
20 TABLET, FILM COATED ORAL DAILY
Qty: 90 TABLET | Refills: 3 | Status: SHIPPED | OUTPATIENT
Start: 2022-05-10

## 2022-05-10 RX ORDER — COLCHICINE 0.6 MG/1
0.6 TABLET ORAL DAILY
Qty: 90 TABLET | Refills: 0 | Status: SHIPPED | OUTPATIENT
Start: 2022-05-10

## 2022-06-28 DIAGNOSIS — E11.59 TYPE 2 DIABETES MELLITUS WITH OTHER CIRCULATORY COMPLICATION, WITHOUT LONG-TERM CURRENT USE OF INSULIN (HCC): ICD-10-CM

## 2022-06-28 DIAGNOSIS — I10 ESSENTIAL HYPERTENSION: ICD-10-CM

## 2022-06-28 RX ORDER — METFORMIN HYDROCHLORIDE 500 MG/1
TABLET, EXTENDED RELEASE ORAL
Qty: 360 TABLET | Refills: 3 | Status: SHIPPED | OUTPATIENT
Start: 2022-06-28

## 2022-06-28 RX ORDER — LISINOPRIL 40 MG/1
40 TABLET ORAL DAILY
Qty: 90 TABLET | Refills: 3 | Status: SHIPPED | OUTPATIENT
Start: 2022-06-28